# Patient Record
Sex: MALE | Race: WHITE | NOT HISPANIC OR LATINO | URBAN - METROPOLITAN AREA
[De-identification: names, ages, dates, MRNs, and addresses within clinical notes are randomized per-mention and may not be internally consistent; named-entity substitution may affect disease eponyms.]

---
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
## 2021-05-28 ENCOUNTER — RECORDS - HEALTHEAST (OUTPATIENT)
Dept: ADMINISTRATIVE | Facility: CLINIC | Age: 21
End: 2021-05-28

## 2021-05-29 NOTE — PROGRESS NOTES
[facility] Well Child Check    ASSESSMENT & PLAN  1. Routine general medical examination at a health care facility  Up-to-date on vaccines  Never sexually active  Reviewed safety issues  Reviewed chemical use, mood      - Hearing Screening  - Vision Screening  - PHQ9 Depression Screen    IMMUNIZATIONS/LABS  No vaccines are due  REFERRALS  Dental:  Recommend routine dental care as appropriate.  Other:  No additional referrals were made at this time.    ANTICIPATORY GUIDANCE  Social- social media. changes and choices  Parenting- family time, increased responsibility, confidential healthcare  Nutrition- unprocessed foods, body image  Health- smoking, alcohol, drugs, sleep, active lifestyle  Safety- seatbelts, swim, helmets, firearms, distracted driving  Sexuality- body changes-  menses, safe sex, contraception    HEALTH HISTORY  Do you have any concerns that you'd like to discuss today?: No concerns       Roomed by: Polita    Refills needed? No    Do you have any forms that need to be filled out? No        Do you have any significant health concerns in your family history?: No  No family history on file.  Since your last visit, have there been any major changes in your family, such as a move, job change, separation, divorce, or death in the family?: No  Has a lack of transportation kept you from medical appointments?: No    Home  Who lives in your home?:  Both parents  Social History     Social History Narrative    Lives with family.     Do you have any concerns about losing your housing?: No  Is your housing safe and comfortable?: Yes  Do you have any trouble with sleep?:  No    Education  What school do you child attend?: Graduated from [employer]  What grade are you in?:  Graduated 2019  How do you perform in school (grades, behavior, attention, homework?: good     Eating  Do you eat regular meals including fruits and vegetables?:  yes  What are you drinking (cow's milk, water, soda, juice, sports drinks,  energy drinks, etc)?: cow's milk- 1%, water, soda, sports drinks and energy drinks  Have you been worried that you don't have enough food?: No  Do you have concerns about your body or appearance?:  No    Activities  Do you have friends?:  yes  Do you get at least one hour of physical activity per day?:  yes  How many hours a day are you in front of a screen other than for schoolwork (computer, TV, phone)?:  4  What do you do for exercise?:  Nikko ball, lacrosse  Do you have interest/participate in community activities/volunteers/school sports?:  no    MENTAL HEALTH SCREENING  No data recorded  No data recorded    VISION/HEARING  Vision: Completed. See Results  Hearing:  Completed. See Results     Hearing Screening    Method: Audiometry    125Hz 250Hz 500Hz 1000Hz 2000Hz 3000Hz 4000Hz 6000Hz 8000Hz   Right ear:   Pass Pass Pass  Pass Pass    Left ear:   Pass Pass Pass  Pass Pass       Visual Acuity Screening    Right eye Left eye Both eyes   Without correction: 0/12.5 10/12.5 10/12.5   With correction:          TB Risk Assessment:  The patient and/or parent/guardian answer positive to:  patient and/or parent/guardian answer 'no' to all screening TB questions    Dyslipidemia Risk Screening  Have either of your parents or any of your grandparents had a stroke or heart attack before age 55?: No  Any parents with high cholesterol or currently taking medications to treat?: No     Dental  When was the last time you saw the dentist?: over 12 months ago   Last fluoride varnish application was within the past 30 days. Fluoride not applied today.      Patient Active Problem List   Diagnosis     Idiopathic Urticaria     Acute Pharyngitis     Fever (Symptom)       Drugs  Does the patient use tobacco/alcohol/drugs?:  no    Safety  Does the patient have any safety concerns (peer or home)?:  no  Does the patient use safety belts, helmets and other safety equipment?:  yes    Sex  Have you ever had sex?:  No    MEASUREMENTS  Height:   "6' 1\" (1.854 m)  Weight: 153 lb (69.4 kg)  BMI: Body mass index is 20.19 kg/m .  Blood Pressure: 134/62  Blood pressure percentiles are not available for patients who are 18 years or older.    PHYSICAL EXAM  Physical Exam  General appearance- vigorous, healthy  Skin- no rash,no lesions  Head - NCAT  Eyes- sclera are white with red reflexes present bilaterally  Ears- normal external morphology, nl ear canals and TMs  Nose- normal nares  Mouth- examined, normal and acceptable dentition  Neck- supple, no adenopathy or thyroid abnormality  Chest- symmetric, equal breath sounds, clear to auscultation  Cardiac-.  Heart with regular rate, normal S1 and S2, no murmurs  Abdomen- umbilicus normal without sign of infection, no significant distention, normal bowel sounds, no organomegaly  -deferred  Ortho- no joint abnormality, spine without significant curvature  Neuro- grossly nonfocal                 "

## 2021-05-30 ENCOUNTER — RECORDS - HEALTHEAST (OUTPATIENT)
Dept: ADMINISTRATIVE | Facility: CLINIC | Age: 21
End: 2021-05-30

## 2021-05-30 VITALS — HEIGHT: 72 IN | BODY MASS INDEX: 20.05 KG/M2 | WEIGHT: 148 LBS

## 2021-05-30 NOTE — TELEPHONE ENCOUNTER
Patient Returning Call  Reason for call:  Patient returning call  Information relayed to patient:  Relayed message below.  Patient stated understanding.  Patient has additional questions:  No  If YES, what are your questions/concerns:  None  Okay to leave a detailed message?: No call back needed

## 2021-05-30 NOTE — TELEPHONE ENCOUNTER
Who is calling:  Patient  Reason for Call:  Patient is requesting a copy of his immunization records be printed and left for him at the  to .  Patient stated he needs this to bring to his college.  Please call patient when this is ready.  Date of last appointment with primary care: 6/13/2019  Okay to leave a detailed message: Yes  [phone]

## 2021-05-30 NOTE — TELEPHONE ENCOUNTER
Called pt, unable to leave voicemail due to mailbox not being set up. Please let pt know a copy of his immunizations is at the  awaiting .

## 2021-05-31 ENCOUNTER — RECORDS - HEALTHEAST (OUTPATIENT)
Dept: ADMINISTRATIVE | Facility: CLINIC | Age: 21
End: 2021-05-31

## 2021-06-01 ENCOUNTER — RECORDS - HEALTHEAST (OUTPATIENT)
Dept: ADMINISTRATIVE | Facility: CLINIC | Age: 21
End: 2021-06-01

## 2021-06-03 VITALS — HEIGHT: 73 IN | BODY MASS INDEX: 20.28 KG/M2 | WEIGHT: 153 LBS

## 2021-06-09 NOTE — PROGRESS NOTES
.  [facility] Well Child Check    ASSESSMENT & PLAN  17-year-old here for a well-child checkup.  This is a sports participation physical.  He plays lacrosse and basketball  Return to clinic in 1 year for a Well Child Check or sooner as needed  Vaccines include flu shot and Menactra #2    IMMUNIZATIONS/LABS  Immunizations were reviewed and orders were placed as appropriate.    REFERRALS  Dental:  Recommend routine dental care as appropriate.  Other:  No additional referrals were made at this time.    ANTICIPATORY GUIDANCE    Social- social media. Changes and choices  Parenting- family time, increased responsibility, confidential healthcare  Nutrition- unprocessed foods, body image  Health- smoking, alcohol, drugs, sleep, active lifestyle  Safety- seatbelts, swim, helmets, firearms, distracted driving  Sexuality- body changes- wet dreams, menses, safe sex, contraception      HEALTH HISTORY  Do you have any concerns that you'd like to discuss today?: No concerns       Roomed by: ernst    Accompanied by Mother Andra   Refills needed? No no   Do you have any forms that need to be filled out? No        Do you have any significant health concerns in your family history?: No  No family history on file.  Since your last visit, have there been any major changes in your family, such as a move, job change, separation, divorce, or death in the family?: No    Home  Who lives in your home?:  Ad and sibling  Social History     Social History Narrative    Lives with family.     Do you have any trouble with sleep?:  No    Education  What school does your child attend?:    What grade is your child in?:  10th  How does the patient perform in school (grades, behavior, attention, homework?:  Doing well     Eating  Does patient eat regular meals including fruits and vegetables?:  yes  What is the patient drinking (cow's milk, water, soda, juice, sports drinks, energy drinks, etc)?:  Cow's milk  Does patient have concerns about body or  "appearance?:  No    Activities  Does the patient have friends?:  Yes  Does the patient get at least one hour of physical activity per day?:  yes  Does the patient have less than 2 hours of screen time per day (aside from homework)?:  no  What does your child do for exercise?:  Sports  Does the patient have interest/participate in community activities/volunteers/school sports?:  yes    MENTAL HEALTH SCREENING  No Data Recorded  No Data Recorded    VISION/HEARING  Vision: Completed. See Results  Hearing:  Completed. See Results     Hearing Screening    125Hz 250Hz 500Hz 1000Hz 2000Hz 3000Hz 4000Hz 6000Hz 8000Hz   Right ear:   Pass Pass Pass  Pass     Left ear:   Pass Pass Pass  Pass        Visual Acuity Screening    Right eye Left eye Both eyes   Without correction: 10/10 10/10 10/10   With correction:          TB Risk Assessment:  The patient and/or parent/guardian answer positive to:  patient and/or parent/guardian answer 'no' to all screening TB questions    Is child seen by dentist?     Yes    Patient Active Problem List   Diagnosis     Idiopathic Urticaria     Acute Pharyngitis     Fever (Symptom)       Drugs  Does the patient use tobacco/alcohol/drugs?:  no    Safety  Does the patient have any safety concerns (peer or home)?:  no  Does the patient use safety belts, helmets and other safety equipment?:  yes    Sex  Is the patient sexually active?:  No    MEASUREMENTS  Height:  6' 0.44\" (1.84 m)  Weight: 148 lb (67.1 kg)  BMI: Body mass index is 19.83 kg/(m^2).  Blood Pressure: 132/64  Blood pressure percentiles are 87 % systolic and 37 % diastolic based on [facility]'s 4th Report. Blood pressure percentile targets: 90: 134/83, 95: 138/87, 99 + 5 mmH/100.    PHYSICAL EXAM  Gen- alert and oriented x3, no acute distress  HEENT- Normocephalic atraumatic   pupils equal and reactive, EOMI.    TMs visualized and normal, ear canals normal.    Mouth moist with normal mucosa no ulceration, dentition normal or in good " repair.  Neck- supple, no adenopathy or thyromegaly  Chest- Normal chest wall apperance, normal inspiration and expiration.  Clear to asculation.  CV- Regular rate and rhythm, normal tones, no murmus, gallops or rubs.  Abd-  Soft, nodistended, nontender.  Normal bowel sounds, no mass or organ enlargement.  Genitalia- normal penis, scrotum, testicles.  No hernia    Ext- Atraumatic,  No synovial thickening. Good perfusion, no edema. Periph pulses detected  Skin- warm and dry, mild acne  Neuro- Cranial nerves grossly intact.  Normal gait, normal strength.  Reflexes symmetric.  Coordination intact.